# Patient Record
Sex: FEMALE | Race: BLACK OR AFRICAN AMERICAN | NOT HISPANIC OR LATINO | URBAN - METROPOLITAN AREA
[De-identification: names, ages, dates, MRNs, and addresses within clinical notes are randomized per-mention and may not be internally consistent; named-entity substitution may affect disease eponyms.]

---

## 2020-09-16 ENCOUNTER — OFFICE VISIT (OUTPATIENT)
Dept: URBAN - METROPOLITAN AREA CLINIC 92 | Facility: CLINIC | Age: 55
End: 2020-09-16
Payer: MEDICARE

## 2020-09-16 ENCOUNTER — DASHBOARD ENCOUNTERS (OUTPATIENT)
Age: 55
End: 2020-09-16

## 2020-09-16 VITALS
WEIGHT: 151.2 LBS | BODY MASS INDEX: 27.82 KG/M2 | TEMPERATURE: 95.6 F | SYSTOLIC BLOOD PRESSURE: 140 MMHG | HEIGHT: 62 IN | DIASTOLIC BLOOD PRESSURE: 83 MMHG | HEART RATE: 90 BPM

## 2020-09-16 DIAGNOSIS — R19.7 DIARRHEA: ICD-10-CM

## 2020-09-16 DIAGNOSIS — K29.70 HELICOBACTER POSITIVE GASTRITIS: ICD-10-CM

## 2020-09-16 PROCEDURE — G8427 DOCREV CUR MEDS BY ELIG CLIN: HCPCS | Performed by: INTERNAL MEDICINE

## 2020-09-16 PROCEDURE — G8417 CALC BMI ABV UP PARAM F/U: HCPCS | Performed by: INTERNAL MEDICINE

## 2020-09-16 PROCEDURE — 1036F TOBACCO NON-USER: CPT | Performed by: INTERNAL MEDICINE

## 2020-09-16 PROCEDURE — 87507 IADNA-DNA/RNA PROBE TQ 12-25: CPT | Performed by: INTERNAL MEDICINE

## 2020-09-16 PROCEDURE — 3017F COLORECTAL CA SCREEN DOC REV: CPT | Performed by: INTERNAL MEDICINE

## 2020-09-16 PROCEDURE — 99213 OFFICE O/P EST LOW 20 MIN: CPT | Performed by: INTERNAL MEDICINE

## 2020-09-16 RX ORDER — GLUCOSAMINE HCL/CHONDROITIN SU 500-400 MG
TAKE 1 CAPSULE BY ORAL ROUTE 2 TIMES A DAY CAPSULE ORAL 2
Qty: 0 | Refills: 0 | Status: ACTIVE | COMMUNITY
Start: 1900-01-01

## 2020-09-16 RX ORDER — WARFARIN SODIUM 5 MG/1
TAKE 1 TABLET (5 MG) BY ORAL ROUTE ONCE DAILY TABLET ORAL 1
Qty: 0 | Refills: 0 | Status: ACTIVE | COMMUNITY
Start: 1900-01-01

## 2020-09-16 RX ORDER — ATORVASTATIN CALCIUM 40 MG/1
TABLET, FILM COATED ORAL
Qty: 0 | Refills: 0 | Status: ACTIVE | COMMUNITY
Start: 1900-01-01

## 2020-09-16 RX ORDER — METOPROLOL SUCCINATE 100 MG/1
TABLET, EXTENDED RELEASE ORAL
Qty: 0 | Refills: 0 | Status: ACTIVE | COMMUNITY
Start: 1900-01-01

## 2020-09-16 RX ORDER — VALGANCICLOVIR 450 MG/1
TAKE 2 TABLETS (900 MG) BY ORAL ROUTE ONCE DAILY WITH FOOD TABLET, FILM COATED ORAL 1
Qty: 0 | Refills: 0 | Status: ACTIVE | COMMUNITY
Start: 1900-01-01

## 2020-09-16 RX ORDER — PANTOPRAZOLE SODIUM 40 MG
TAKE 1 TABLET (40 MG) BY ORAL ROUTE 2 TIMES PER DAY TABLET, DELAYED RELEASE (ENTERIC COATED) ORAL 2
Qty: 0 | Refills: 0 | Status: ACTIVE | COMMUNITY
Start: 1900-01-01

## 2020-09-16 NOTE — HPI-TODAY'S VISIT:
This is a 54-year-old -American female who presents today for a new problem she notes that a few months ago she developed diarrhea she is having approximately 3-4 bowel movements per day there is no blood in her stool.  She does have significant urgency and has had an occasional accident.  She does have nocturnal awakenings for defecation approximately 2-3 times per week.  Her last colonoscopy was in 2008 and reportedly normal.  There are no sick contacts.  There is no fever.

## 2020-09-26 LAB
ADENOVIRUS F 40/41: NOT DETECTED
ASTROVIRUS: NOT DETECTED
C DIFFICILE TOXIN A/B: NOT DETECTED
CAMPYLOBACTER: NOT DETECTED
CRYPTOSPORIDIUM: NOT DETECTED
CYCLOSPORA CAYETANENSIS: NOT DETECTED
E COLI O157: (no result)
ENTAMOEBA HISTOLYTICA: NOT DETECTED
ENTEROAGGREGATIVE E COLI: NOT DETECTED
ENTEROPATHOGENIC E COLI: NOT DETECTED
ENTEROTOXIGENIC E COLI: NOT DETECTED
GIARDIA LAMBLIA: NOT DETECTED
NOROVIRUS GI/GII: NOT DETECTED
PLESIOMONAS SHIGELLOIDES: NOT DETECTED
ROTAVIRUS A: NOT DETECTED
SALMONELLA: NOT DETECTED
SAPOVIRUS: NOT DETECTED
SHIGA-TOXIN-PRODUCING E COLI: NOT DETECTED
SHIGELLA/ENTEROINVASIVE E COLI: NOT DETECTED
VIBRIO CHOLERAE: NOT DETECTED
VIBRIO: NOT DETECTED
YERSINIA ENTEROCOLITICA: NOT DETECTED

## 2020-09-29 ENCOUNTER — TELEPHONE ENCOUNTER (OUTPATIENT)
Dept: URBAN - METROPOLITAN AREA CLINIC 92 | Facility: CLINIC | Age: 55
End: 2020-09-29